# Patient Record
Sex: FEMALE | Race: WHITE | NOT HISPANIC OR LATINO | Employment: FULL TIME | URBAN - METROPOLITAN AREA
[De-identification: names, ages, dates, MRNs, and addresses within clinical notes are randomized per-mention and may not be internally consistent; named-entity substitution may affect disease eponyms.]

---

## 2017-02-11 ENCOUNTER — HISTORICAL (OUTPATIENT)
Dept: LAB | Facility: HOSPITAL | Age: 52
End: 2017-02-11

## 2017-07-24 ENCOUNTER — HISTORICAL (OUTPATIENT)
Dept: LAB | Facility: HOSPITAL | Age: 52
End: 2017-07-24

## 2017-08-10 ENCOUNTER — HISTORICAL (OUTPATIENT)
Dept: RADIOLOGY | Facility: HOSPITAL | Age: 52
End: 2017-08-10

## 2018-08-09 ENCOUNTER — HISTORICAL (OUTPATIENT)
Dept: LAB | Facility: HOSPITAL | Age: 53
End: 2018-08-09

## 2018-10-19 ENCOUNTER — HISTORICAL (OUTPATIENT)
Dept: RADIOLOGY | Facility: HOSPITAL | Age: 53
End: 2018-10-19

## 2019-07-02 ENCOUNTER — HISTORICAL (OUTPATIENT)
Dept: EMERGENCY MEDICINE | Facility: HOSPITAL | Age: 54
End: 2019-07-02

## 2019-08-05 ENCOUNTER — HISTORICAL (OUTPATIENT)
Dept: LAB | Facility: HOSPITAL | Age: 54
End: 2019-08-05

## 2019-10-26 ENCOUNTER — HISTORICAL (OUTPATIENT)
Dept: RADIOLOGY | Facility: HOSPITAL | Age: 54
End: 2019-10-26

## 2019-11-04 ENCOUNTER — HISTORICAL (OUTPATIENT)
Dept: LAB | Facility: HOSPITAL | Age: 54
End: 2019-11-04

## 2019-11-04 LAB
BUN SERPL-MCNC: 27.1 MG/DL (ref 7–18)
CALCIUM SERPL-MCNC: 8.9 MG/DL (ref 8.5–10.1)
CHLORIDE SERPL-SCNC: 105 MMOL/L (ref 98–107)
CO2 SERPL-SCNC: 31.1 MMOL/L (ref 21–32)
CREAT SERPL-MCNC: 0.92 MG/DL (ref 0.6–1.3)
CREAT/UREA NIT SERPL: 29
GLUCOSE SERPL-MCNC: 134 MG/DL (ref 74–106)
POTASSIUM SERPL-SCNC: 3.4 MMOL/L (ref 3.5–5.1)
SODIUM SERPL-SCNC: 144 MMOL/L (ref 136–145)

## 2019-11-09 ENCOUNTER — HISTORICAL (OUTPATIENT)
Dept: LAB | Facility: HOSPITAL | Age: 54
End: 2019-11-09

## 2019-11-09 LAB — POTASSIUM SERPL-SCNC: 3.6 MMOL/L (ref 3.5–5.1)

## 2020-02-26 ENCOUNTER — HISTORICAL (OUTPATIENT)
Dept: LAB | Facility: HOSPITAL | Age: 55
End: 2020-02-26

## 2020-12-21 ENCOUNTER — HISTORICAL (OUTPATIENT)
Dept: RADIOLOGY | Facility: HOSPITAL | Age: 55
End: 2020-12-21

## 2021-11-11 ENCOUNTER — HISTORICAL (OUTPATIENT)
Dept: LAB | Facility: HOSPITAL | Age: 56
End: 2021-11-11

## 2022-02-21 ENCOUNTER — HISTORICAL (OUTPATIENT)
Dept: RADIOLOGY | Facility: HOSPITAL | Age: 57
End: 2022-02-21

## 2022-02-21 ENCOUNTER — HISTORICAL (OUTPATIENT)
Dept: ADMINISTRATIVE | Facility: HOSPITAL | Age: 57
End: 2022-02-21

## 2022-04-07 ENCOUNTER — HISTORICAL (OUTPATIENT)
Dept: ADMINISTRATIVE | Facility: HOSPITAL | Age: 57
End: 2022-04-07

## 2022-04-23 VITALS
WEIGHT: 185.88 LBS | OXYGEN SATURATION: 96 % | BODY MASS INDEX: 27.53 KG/M2 | SYSTOLIC BLOOD PRESSURE: 155 MMHG | DIASTOLIC BLOOD PRESSURE: 91 MMHG | HEIGHT: 69 IN

## 2022-04-29 NOTE — ED PROVIDER NOTES
Patient:   Ashley Murguia            MRN: [id number]            FIN: [id number]               Age:   54 years     Sex:  Female     :  1965   Associated Diagnoses:   Hypertension; Tension headache   Author:   Daniel Munoz MD      Basic Information   Time seen: Date & time 2019 10:48:00.   History source: Patient.   Arrival mode: Private vehicle.   History limitation: None.   Additional information: Chief Complaint from Nursing Triage Note : Chief Complaint   2019 10:49 CDT       Chief Complaint           states stopped taking b/p meds and feels dizzy and has high b/p for a week  .      History of Present Illness   The patient presents with c/o ha since stopping her bp Meds  said the b blocker was making her feel bad so she stopped it last week  no her bp is running high  still taking her Diovan.  The onset was 1  weeks ago.  The course/duration of symptoms is constant.  The character of symptoms is: The degree at onset was minimal.  The degree at maximum was minimal.  The degree at present is minimal.  Risk factors consist of hypertension.  Prior episodes: occasional.  Therapy today: prescription medications.  Associated symptoms: headache and denies chest pain.        Review of Systems   Constitutional symptoms:  No fever, no weakness.    Respiratory symptoms:  No shortness of breath,    Cardiovascular symptoms:  No chest pain,    Neurologic symptoms:  Headache.   Psychiatric symptoms:  Anxiety.             Additional review of systems information: All other systems reviewed and otherwise negative.      Health Status   Allergies:    Allergic Reactions (Selected)  No Known Allergies,    Allergies (1) Active Reaction  No Known Allergies None Documented  .   Medications:  (Selected)   Prescriptions  Prescribed  Flomax 0.4 mg oral capsule: 0.4 mg = 1 cap(s), Oral, Daily, # 15 cap(s), 0 Refill(s)  Zofran 4 mg oral tablet: 4 mg = 1 tab(s), Oral, q4hr, # 12 tab(s), 0 Refill(s)  Documented  Medications  Documented  Cipro 500 mg oral tablet: 500 mg = 1 tab(s), Oral, q12hr, # 28 tab(s), 0 Refill(s)  Coreg 12.5 mg oral tablet: 12.5 mg = 1 tab(s), Oral, BID, # 60 tab(s), 0 Refill(s)  Coreg 25 mg oral tablet: 25 mg = 1 tab(s), Oral, BID, # 180 tab(s), 0 Refill(s)  Crestor 5 mg oral tablet: 5 mg = 1 tab(s), Oral, Daily, # 30 tab(s), 0 Refill(s)  Diovan 160 mg oral tablet: 160 mg = 1 tab(s), Oral, Daily  Lasix 20 mg oral tablet: 20 mg = 1 tab(s), Oral, Daily, # 30 tab(s), 0 Refill(s)  Levothroid 75 mcg (0.075 mg) oral tablet: 75 mcg = 1 tab(s), Oral, Daily, # 30 tab(s), 0 Refill(s)  Luvox 100 mg Tablet: 100 mg = 1 tab(s), Oral, BID, # 180 tab(s), 0 Refill(s)  irbesartan 150 mg oral tablet: 150 mg = 1 tab(s), Oral, Daily, # 90 tab(s), 0 Refill(s)  labetalol 300 mg oral tablet: 300 mg = 1 tab(s), Oral, BID, # 60 tab(s), 0 Refill(s)  meloxicam 15 mg oral tablet: 15 mg = 1 tab(s), Oral, Daily, # 30 tab(s), 0 Refill(s)  methocarbamol 750 mg oral tablet: 1,500 mg = 2 tab(s), Oral, TID, # 60 tab(s), 0 Refill(s)  potassium chloride 20 mEq oral TABLET extended release: 20 mEq = 1 tab(s), Oral, Daily, # 30 tab(s), 0 Refill(s).      Past Medical/ Family/ Social History   Medical history:    No active or resolved past medical history items have been selected or recorded..   Surgical history:    Hysterectomy Total Abdominal on 7/1/2016 at 51 Years.  Comments:  7/1/2016 8:59 [facility] - Sheldon WEST, Milena PARNELL  auto-populated from documented surgical case  left breast lumpectomy.  Partial hysterectomy ([id number])..   Family history:    No family history items have been selected or recorded..   Social history: Not significant.      Physical Examination               Vital Signs   Vital Signs   7/2/2019 10:49 CDT       Temperature Oral          36.9 DegC                             Temperature Oral (calculated)             98.42 DegF                             Peripheral Pulse Rate     73 bpm                              Respiratory Rate          20 br/min                             SpO2                      99 %                             Oxygen Therapy            Room air                             Systolic Blood Pressure   162 mmHg  HI                             Diastolic Blood Pressure  87 mmHg  .   Measurements   7/2/2019 10:49 CDT       Weight Dosing             80 kg                             Weight Measured and Calculated in Lbs     176.37 lb                             Weight Estimated          80 kg                             Height/Length Dosing      172 cm                             Height/Length Estimated   172 cm                             Body Mass Index Estimated 27.04 kg/m2  .   Basic Oxygen Information   7/2/2019 10:49 CDT       SpO2                      99 %                             Oxygen Therapy            Room air  .   General:  Alert, no acute distress.    Skin:  Warm, pink, intact, no rash.    Eye:  Pupils are equal, round and reactive to light, extraocular movements are intact, normal conjunctiva.    Ears, nose, mouth and throat:  Oral mucosa moist, no pharyngeal erythema or exudate.    Neck:  Supple, trachea midline, no tenderness, no JVD.    Cardiovascular:  Regular rate and rhythm, No murmur, Normal peripheral perfusion, No edema.    Respiratory:  Lungs are clear to auscultation, respirations are non-labored, breath sounds are equal, Symmetrical chest wall expansion.    Musculoskeletal:  Normal ROM, normal strength, no tenderness, no swelling, no deformity.    Chest wall   Gastrointestinal:  Soft, Nontender, Non distended, Normal bowel sounds.    Neurological:  Alert and oriented to person, place, time, and situation, No focal neurological deficit observed, CN II-XII intact, normal sensory observed, normal motor observed, normal speech observed, normal coordination observed, normal and symmetrical reflexes.    Lymphatics   Psychiatric:  Cooperative, appropriate mood & affect.       Medical  Decision Making   Electrocardiogram:  Time 7/2/2019 11:13:00, rate 71, normal sinus rhythm, No ST-T changes, no ectopy, normal AZ & QRS intervals.    Results review:  Lab results : Lab View   7/2/2019 11:11 CDT       WBC                       5.2 x10(3)/mcL                             RBC                       4.53 x10(6)/mcL                             Hgb                       13.7 gm/dL                             Hct                       38.7 %                             Platelet                  251 x10(3)/mcL                             MCV                       85.4 fL                             MCH                       30.2 pg                             MCHC                      35.4 gm/dL                             RDW                       11.7 %                             MPV                       9.6 fL                             Abs Neut                  3.42 x10(3)/mcL                             Neutro Auto               65 %  NA                             Lymph Auto                24 %  NA                             Mono Auto                 8 %  NA                             Eos Auto                  2 %  NA                             Abs Eos                   0.1 x10(3)/mcL                             Basophil Auto             1 %  NA                             Abs Neutro                3.42 x10(3)/mcL                             Abs Lymph                 1.2 x10(3)/mcL                             Abs Mono                  0.4 x10(3)/mcL                             Abs Baso                  0.1 x10(3)/mcL  ,    Labs (Last four charted values)  WBC                  5.2 (JUL 02)   Hgb                  13.7 (JUL 02)   Hct                  38.7 (JUL 02)   Plt                  251 (JUL 02)   Na                   141 (JUL 02)   K                    3.8 (JUL 02)   CO2                  29.0 (JUL 02)   Cl                   106 (JUL 02)   Cr                   0.78 (JUL 02)   BUN                   H 19.0 (JUL 02)   Glucose Random       103 (JUL 02) , All Results   7/2/2019 10:49 CDT       Triage Note                 (Modified)   7/2/2019 11:11 CDT       WBC                       5.2 x10(3)/mcL                             RBC                       4.53 x10(6)/mcL                             Hgb                       13.7 gm/dL                             Hct                       38.7 %                             Platelet                  251 x10(3)/mcL                             MCV                       85.4 fL                             MCH                       30.2 pg                             MCHC                      35.4 gm/dL                             RDW                       11.7 %                             MPV                       9.6 fL                             Abs Neut                  3.42 x10(3)/mcL                             Neutro Auto               65 %  NA                             Lymph Auto                24 %  NA                             Mono Auto                 8 %  NA                             Eos Auto                  2 %  NA                             Abs Eos                   0.1 x10(3)/mcL                             Basophil Auto             1 %  NA                             Abs Neutro                3.42 x10(3)/mcL                             Abs Lymph                 1.2 x10(3)/mcL                             Abs Mono                  0.4 x10(3)/mcL                             Abs Baso                  0.1 x10(3)/mcL                             Sodium Lvl                141 mmol/L                             Potassium Lvl             3.8 mmol/L                             Chloride                  106 mmol/L                             CO2                       29.0 mmol/L                             Calcium Lvl               9.2 mg/dL                             Magnesium Lvl             2.2 mg/dL                             Glucose Lvl                103 mg/dL                             BUN                       19.0 mg/dL  HI                             Creatinine                0.78 mg/dL                             BUN/Creat Ratio           24  NA                             eGFR-AA                   >60 mL/min/1.73 m2  NA                             eGFR-MARY                  >60 mL/min/1.73 m2  NA                             TSH                       0.549 mIU/mL    7/2/2019 10:55 CDT       Pain Present              No actual or suspected pain                             Respirations              Unlabored                             Respiratory Pattern       Regular    7/2/2019 10:49 CDT       Weight Dosing             80 kg                             Weight Measured and Calculated in Lbs     176.37 lb                             Weight Estimated          80 kg                             Height/Length Dosing      172 cm                             Height/Length Estimated   172 cm                             Body Mass Index Estimated 27.04 kg/m2                             Temperature Oral          36.9 DegC                             Temperature Oral (calculated)             98.42 DegF                             Peripheral Pulse Rate     73 bpm                             Respiratory Rate          20 br/min                             SpO2                      99 %                             Oxygen Therapy            Room air                             Systolic Blood Pressure   162 mmHg  HI                             Diastolic Blood Pressure  87 mmHg                             Level of Consciousness    Alert  .       Reexamination/ Reevaluation   Time: 7/2/2019 12:06:00 .   Vital signs   per nurse's notes   Course: improving.   Assessment: exam unchanged.      Impression and Plan   Diagnosis   Hypertension (WMU68-CG I10)   Tension headache (XKP40-QO G44.209)   Plan   Condition: Improved, Stable.    Disposition: Medically cleared, Discharged: Time   7/2/2019 12:07:00, to home.    Prescriptions: Launch prescriptions   Pharmacy:  Norvasc 5 mg oral tablet (Prescribe): 5 mg = 1 tab(s), Oral, Daily, X 30 day(s), # 30 tab(s), 0 Refill(s), Pharmacy: [id number].    Patient was given the following educational materials: Hypertension, Hypertension.    Follow up with: Morgan Lofton MD; Follow-up with PCP in 3 to 5 days Follow-up with PCP in 3 to 5 days.    Counseled: Patient, Regarding diagnosis, Regarding diagnostic results, Regarding treatment plan, Regarding prescription, Patient indicated understanding of instructions.

## 2023-03-24 ENCOUNTER — HOSPITAL ENCOUNTER (OUTPATIENT)
Dept: RADIOLOGY | Facility: HOSPITAL | Age: 58
Discharge: HOME | End: 2023-03-24
Attending: OBSTETRICS & GYNECOLOGY
Payer: COMMERCIAL

## 2023-03-24 DIAGNOSIS — Z12.31 ENCOUNTER FOR SCREENING MAMMOGRAM FOR MALIGNANT NEOPLASM OF BREAST: ICD-10-CM

## 2023-03-24 PROCEDURE — 77063 MAMMO DIGITAL SCREENING BILAT WITH TOMO: ICD-10-PCS | Mod: 26,,, | Performed by: STUDENT IN AN ORGANIZED HEALTH CARE EDUCATION/TRAINING PROGRAM

## 2023-03-24 PROCEDURE — 77067 SCR MAMMO BI INCL CAD: CPT | Mod: 26,,, | Performed by: STUDENT IN AN ORGANIZED HEALTH CARE EDUCATION/TRAINING PROGRAM

## 2023-03-24 PROCEDURE — 77067 SCR MAMMO BI INCL CAD: CPT | Mod: TC

## 2023-03-24 PROCEDURE — 77067 MAMMO DIGITAL SCREENING BILAT WITH TOMO: ICD-10-PCS | Mod: 26,,, | Performed by: STUDENT IN AN ORGANIZED HEALTH CARE EDUCATION/TRAINING PROGRAM

## 2023-03-24 PROCEDURE — 77063 BREAST TOMOSYNTHESIS BI: CPT | Mod: 26,,, | Performed by: STUDENT IN AN ORGANIZED HEALTH CARE EDUCATION/TRAINING PROGRAM

## 2023-07-05 ENCOUNTER — LAB VISIT (OUTPATIENT)
Dept: LAB | Facility: HOSPITAL | Age: 58
End: 2023-07-05
Attending: NURSE PRACTITIONER
Payer: COMMERCIAL

## 2023-07-05 DIAGNOSIS — D51.9 VITAMIN B12 DEFICIENCY ANEMIA: ICD-10-CM

## 2023-07-05 DIAGNOSIS — I51.9 MYXEDEMA HEART DISEASE: ICD-10-CM

## 2023-07-05 DIAGNOSIS — E03.9 MYXEDEMA HEART DISEASE: ICD-10-CM

## 2023-07-05 DIAGNOSIS — E55.9 AVITAMINOSIS D: ICD-10-CM

## 2023-07-05 DIAGNOSIS — Z00.00 ROUTINE GENERAL MEDICAL EXAMINATION AT A HEALTH CARE FACILITY: Primary | ICD-10-CM

## 2023-07-05 LAB
ALBUMIN SERPL-MCNC: 3.9 G/DL (ref 3.5–5)
ALBUMIN/GLOB SERPL: 1.3 RATIO (ref 1.1–2)
ALP SERPL-CCNC: 74 UNIT/L (ref 40–150)
ALT SERPL-CCNC: 23 UNIT/L (ref 0–55)
AST SERPL-CCNC: 18 UNIT/L (ref 5–34)
BASOPHILS # BLD AUTO: 0.07 X10(3)/MCL
BASOPHILS NFR BLD AUTO: 1.1 %
BILIRUBIN DIRECT+TOT PNL SERPL-MCNC: 0.4 MG/DL
BUN SERPL-MCNC: 18.3 MG/DL (ref 9.8–20.1)
CALCIUM SERPL-MCNC: 9.3 MG/DL (ref 8.4–10.2)
CHLORIDE SERPL-SCNC: 102 MMOL/L (ref 98–107)
CHOLEST SERPL-MCNC: 169 MG/DL
CHOLEST/HDLC SERPL: 4 {RATIO} (ref 0–5)
CO2 SERPL-SCNC: 32 MMOL/L (ref 22–29)
CREAT SERPL-MCNC: 0.73 MG/DL (ref 0.55–1.02)
DEPRECATED CALCIDIOL+CALCIFEROL SERPL-MC: 48.4 NG/ML (ref 30–80)
EOSINOPHIL # BLD AUTO: 0.12 X10(3)/MCL (ref 0–0.9)
EOSINOPHIL NFR BLD AUTO: 1.8 %
ERYTHROCYTE [DISTWIDTH] IN BLOOD BY AUTOMATED COUNT: 12 % (ref 11.5–17)
FOLATE SERPL-MCNC: 16.2 NG/ML (ref 7–31.4)
FT4I SERPL CALC-MCNC: 3.56 (ref 2.6–3.6)
GFR SERPLBLD CREATININE-BSD FMLA CKD-EPI: >60 MLS/MIN/1.73/M2
GLOBULIN SER-MCNC: 3 GM/DL (ref 2.4–3.5)
GLUCOSE SERPL-MCNC: 95 MG/DL (ref 74–100)
HCT VFR BLD AUTO: 43.7 % (ref 37–47)
HDLC SERPL-MCNC: 48 MG/DL (ref 35–60)
HGB BLD-MCNC: 14 G/DL (ref 12–16)
IMM GRANULOCYTES # BLD AUTO: 0 X10(3)/MCL (ref 0–0.04)
IMM GRANULOCYTES NFR BLD AUTO: 0 %
LDLC SERPL CALC-MCNC: 90 MG/DL (ref 50–140)
LYMPHOCYTES # BLD AUTO: 1.98 X10(3)/MCL (ref 0.6–4.6)
LYMPHOCYTES NFR BLD AUTO: 30.3 %
MCH RBC QN AUTO: 28.6 PG (ref 27–31)
MCHC RBC AUTO-ENTMCNC: 32 G/DL (ref 33–36)
MCV RBC AUTO: 89.4 FL (ref 80–94)
MONOCYTES # BLD AUTO: 0.53 X10(3)/MCL (ref 0.1–1.3)
MONOCYTES NFR BLD AUTO: 8.1 %
NEUTROPHILS # BLD AUTO: 3.83 X10(3)/MCL (ref 2.1–9.2)
NEUTROPHILS NFR BLD AUTO: 58.7 %
PLATELET # BLD AUTO: 287 X10(3)/MCL (ref 130–400)
PMV BLD AUTO: 9.3 FL (ref 7.4–10.4)
POTASSIUM SERPL-SCNC: 3.9 MMOL/L (ref 3.5–5.1)
PROT SERPL-MCNC: 6.9 GM/DL (ref 6.4–8.3)
RBC # BLD AUTO: 4.89 X10(6)/MCL (ref 4.2–5.4)
SODIUM SERPL-SCNC: 140 MMOL/L (ref 136–145)
T3RU NFR SERPL: 36.5 % (ref 31–39)
T4 SERPL-MCNC: 9.74 UG/DL (ref 4.87–11.72)
TRIGL SERPL-MCNC: 157 MG/DL (ref 37–140)
TSH SERPL-ACNC: 1.6 UIU/ML (ref 0.35–4.94)
VIT B12 SERPL-MCNC: 740 PG/ML (ref 213–816)
VLDLC SERPL CALC-MCNC: 31 MG/DL
WBC # SPEC AUTO: 6.53 X10(3)/MCL (ref 4.5–11.5)

## 2023-07-05 PROCEDURE — 82746 ASSAY OF FOLIC ACID SERUM: CPT

## 2023-07-05 PROCEDURE — 84436 ASSAY OF TOTAL THYROXINE: CPT

## 2023-07-05 PROCEDURE — 85025 COMPLETE CBC W/AUTO DIFF WBC: CPT

## 2023-07-05 PROCEDURE — 80053 COMPREHEN METABOLIC PANEL: CPT

## 2023-07-05 PROCEDURE — 82306 VITAMIN D 25 HYDROXY: CPT

## 2023-07-05 PROCEDURE — 84443 ASSAY THYROID STIM HORMONE: CPT

## 2023-07-05 PROCEDURE — 80061 LIPID PANEL: CPT

## 2023-07-05 PROCEDURE — 82607 VITAMIN B-12: CPT

## 2023-07-05 PROCEDURE — 36415 COLL VENOUS BLD VENIPUNCTURE: CPT

## 2024-01-03 ENCOUNTER — LAB VISIT (OUTPATIENT)
Dept: LAB | Facility: HOSPITAL | Age: 59
End: 2024-01-03
Attending: NURSE PRACTITIONER
Payer: COMMERCIAL

## 2024-01-03 DIAGNOSIS — E03.9 MYXEDEMA HEART DISEASE: Primary | ICD-10-CM

## 2024-01-03 DIAGNOSIS — E55.9 AVITAMINOSIS D: ICD-10-CM

## 2024-01-03 DIAGNOSIS — I51.9 MYXEDEMA HEART DISEASE: Primary | ICD-10-CM

## 2024-01-03 LAB
DEPRECATED CALCIDIOL+CALCIFEROL SERPL-MC: 37.6 NG/ML (ref 30–80)
FT4I SERPL CALC-MCNC: 3.05 (ref 2.6–3.6)
T3RU NFR SERPL: 31.68 % (ref 31–39)
T4 SERPL-MCNC: 9.63 UG/DL (ref 4.87–11.72)
TSH SERPL-ACNC: 0.65 UIU/ML (ref 0.35–4.94)

## 2024-01-03 PROCEDURE — 82306 VITAMIN D 25 HYDROXY: CPT

## 2024-01-03 PROCEDURE — 84443 ASSAY THYROID STIM HORMONE: CPT

## 2024-01-03 PROCEDURE — 36415 COLL VENOUS BLD VENIPUNCTURE: CPT

## 2024-01-03 PROCEDURE — 84436 ASSAY OF TOTAL THYROXINE: CPT

## 2024-04-16 ENCOUNTER — HOSPITAL ENCOUNTER (OUTPATIENT)
Dept: RADIOLOGY | Facility: HOSPITAL | Age: 59
Discharge: HOME | End: 2024-04-16
Attending: OBSTETRICS & GYNECOLOGY
Payer: COMMERCIAL

## 2024-04-16 DIAGNOSIS — Z12.31 ENCOUNTER FOR SCREENING MAMMOGRAM FOR MALIGNANT NEOPLASM OF BREAST: ICD-10-CM

## 2024-04-16 PROCEDURE — 77063 BREAST TOMOSYNTHESIS BI: CPT | Mod: 26,,, | Performed by: RADIOLOGY

## 2024-04-16 PROCEDURE — 77067 SCR MAMMO BI INCL CAD: CPT | Mod: 26,,, | Performed by: RADIOLOGY

## 2024-04-16 PROCEDURE — 77063 BREAST TOMOSYNTHESIS BI: CPT | Mod: TC

## 2024-08-24 ENCOUNTER — LAB VISIT (OUTPATIENT)
Dept: LAB | Facility: HOSPITAL | Age: 59
End: 2024-08-24
Attending: NURSE PRACTITIONER
Payer: COMMERCIAL

## 2024-08-24 DIAGNOSIS — I10 ESSENTIAL HYPERTENSION, MALIGNANT: Primary | ICD-10-CM

## 2024-08-24 LAB
ALBUMIN SERPL-MCNC: 3.8 G/DL (ref 3.4–4.8)
ALBUMIN/GLOB SERPL: 1.2 RATIO (ref 1.1–2)
ALP SERPL-CCNC: 88 UNIT/L (ref 40–150)
ALT SERPL-CCNC: 22 UNIT/L (ref 0–55)
ANION GAP SERPL CALC-SCNC: 8 MEQ/L
AST SERPL-CCNC: 19 UNIT/L (ref 5–34)
BASOPHILS # BLD AUTO: 0.05 X10(3)/MCL
BASOPHILS NFR BLD AUTO: 0.9 %
BILIRUB SERPL-MCNC: 0.4 MG/DL
BUN SERPL-MCNC: 16.7 MG/DL (ref 9.8–20.1)
CALCIUM SERPL-MCNC: 9.2 MG/DL (ref 8.4–10.2)
CHLORIDE SERPL-SCNC: 108 MMOL/L (ref 98–107)
CHOLEST SERPL-MCNC: 151 MG/DL
CHOLEST/HDLC SERPL: 3 {RATIO} (ref 0–5)
CO2 SERPL-SCNC: 28 MMOL/L (ref 23–31)
CREAT SERPL-MCNC: 0.74 MG/DL (ref 0.55–1.02)
CREAT/UREA NIT SERPL: 23
EOSINOPHIL # BLD AUTO: 0.16 X10(3)/MCL (ref 0–0.9)
EOSINOPHIL NFR BLD AUTO: 3 %
ERYTHROCYTE [DISTWIDTH] IN BLOOD BY AUTOMATED COUNT: 12.2 % (ref 11.5–17)
FT4I SERPL CALC-MCNC: 2.65 (ref 2.6–3.6)
GFR SERPLBLD CREATININE-BSD FMLA CKD-EPI: >60 ML/MIN/1.73/M2
GLOBULIN SER-MCNC: 3.2 GM/DL (ref 2.4–3.5)
GLUCOSE SERPL-MCNC: 98 MG/DL (ref 82–115)
HCT VFR BLD AUTO: 43 % (ref 37–47)
HDLC SERPL-MCNC: 46 MG/DL (ref 35–60)
HGB BLD-MCNC: 14.1 G/DL (ref 12–16)
IMM GRANULOCYTES # BLD AUTO: 0.01 X10(3)/MCL (ref 0–0.04)
IMM GRANULOCYTES NFR BLD AUTO: 0.2 %
LDLC SERPL CALC-MCNC: 89 MG/DL (ref 50–140)
LYMPHOCYTES # BLD AUTO: 1.59 X10(3)/MCL (ref 0.6–4.6)
LYMPHOCYTES NFR BLD AUTO: 30 %
MCH RBC QN AUTO: 29.7 PG (ref 27–31)
MCHC RBC AUTO-ENTMCNC: 32.8 G/DL (ref 33–36)
MCV RBC AUTO: 90.7 FL (ref 80–94)
MONOCYTES # BLD AUTO: 0.56 X10(3)/MCL (ref 0.1–1.3)
MONOCYTES NFR BLD AUTO: 10.6 %
NEUTROPHILS # BLD AUTO: 2.93 X10(3)/MCL (ref 2.1–9.2)
NEUTROPHILS NFR BLD AUTO: 55.3 %
PLATELET # BLD AUTO: 256 X10(3)/MCL (ref 130–400)
PMV BLD AUTO: 9.3 FL (ref 7.4–10.4)
POTASSIUM SERPL-SCNC: 3.6 MMOL/L (ref 3.5–5.1)
PROT SERPL-MCNC: 7 GM/DL (ref 5.8–7.6)
RBC # BLD AUTO: 4.74 X10(6)/MCL (ref 4.2–5.4)
SODIUM SERPL-SCNC: 144 MMOL/L (ref 136–145)
T3RU NFR SERPL: 31.29 % (ref 31–39)
T4 SERPL-MCNC: 8.46 UG/DL (ref 4.87–11.72)
TRIGL SERPL-MCNC: 79 MG/DL (ref 37–140)
TSH SERPL-ACNC: 1.08 UIU/ML (ref 0.35–4.94)
VLDLC SERPL CALC-MCNC: 16 MG/DL
WBC # BLD AUTO: 5.3 X10(3)/MCL (ref 4.5–11.5)

## 2024-08-24 PROCEDURE — 80061 LIPID PANEL: CPT

## 2024-08-24 PROCEDURE — 36415 COLL VENOUS BLD VENIPUNCTURE: CPT

## 2024-08-24 PROCEDURE — 80053 COMPREHEN METABOLIC PANEL: CPT

## 2024-08-24 PROCEDURE — 85025 COMPLETE CBC W/AUTO DIFF WBC: CPT

## 2024-08-24 PROCEDURE — 84436 ASSAY OF TOTAL THYROXINE: CPT

## 2024-08-24 PROCEDURE — 84443 ASSAY THYROID STIM HORMONE: CPT

## 2025-03-03 ENCOUNTER — HOSPITAL ENCOUNTER (EMERGENCY)
Facility: HOSPITAL | Age: 60
Discharge: HOME | End: 2025-03-03
Attending: EMERGENCY MEDICINE
Payer: COMMERCIAL

## 2025-03-03 VITALS
SYSTOLIC BLOOD PRESSURE: 146 MMHG | HEART RATE: 94 BPM | DIASTOLIC BLOOD PRESSURE: 102 MMHG | RESPIRATION RATE: 20 BRPM | HEIGHT: 68 IN | BODY MASS INDEX: 30.31 KG/M2 | WEIGHT: 200 LBS | TEMPERATURE: 99 F | OXYGEN SATURATION: 97 %

## 2025-03-03 DIAGNOSIS — R09.89 THROAT TIGHTNESS: Primary | ICD-10-CM

## 2025-03-03 LAB — STREP A PCR (OHS): NOT DETECTED

## 2025-03-03 PROCEDURE — 87651 STREP A DNA AMP PROBE: CPT | Performed by: EMERGENCY MEDICINE

## 2025-03-03 PROCEDURE — 99282 EMERGENCY DEPT VISIT SF MDM: CPT

## 2025-03-03 NOTE — ED PROVIDER NOTES
"NAME:  Ashley Murguia  CSN:     [id number]  MRN:    [id number]  ADMIT DATE: 3/3/2025        eMERGENCY dEPARTMENT eNCOUnter    CHIEF COMPLAINT    Chief Complaint   Patient presents with    Sore Throat     Complains of a tightness and pain in her throat x 3 weeks. Worse in the mornings. Denies fever, no drooling noted.       HPI      Ashley Murguia is a 59 y.o. female who presents to the ED complains of feeling of tightness in her throat ongoing intermittently for the past 3 weeks.  She reports it is worse in the morning.  She has no objective difficulty swallowing or breathing but has a sensation of difficulty swallowing and breathing.  She denies any throat pain.  No fevers.        ALLERGIES    Review of patient's allergies indicates:  No Known Allergies    PAST MEDICAL HISTORY  History reviewed. No pertinent past medical history.    SURGICAL HISTORY    Past Surgical History:   Procedure Laterality Date    HYSTERECTOMY      LUMPECTOMY, BREAST Left        SOCIAL HISTORY    Social History     Socioeconomic History    Marital status: Unknown   Tobacco Use    Smoking status: Never    Smokeless tobacco: Never       FAMILY HISTORY    Family History   Problem Relation Name Age of Onset    Diabetes Father      Hypertension Father      Kidney cancer Father      Hypertension Mother      Ovarian cancer Mother         REVIEW OF SYSTEMS   ROS  All Systems otherwise negative except as noted in the History of Present Illness.        PHYSICAL EXAM    Reviewed Triage Note  VITAL SIGNS:   ED Triage Vitals [03/03/25 0857]   Encounter Vitals Group      BP (!) 146/102      Systolic BP Percentile       Diastolic BP Percentile       Pulse 94      Resp 20      Temp 98.5 °F (36.9 °C)      Temp Source Oral      SpO2 97 %      Weight 200 lb      Height 5' 8"      Head Circumference       Peak Flow       Pain Score       Pain Loc       Pain Education       Exclude from Growth Chart        Patient Vitals for the past 24 hrs:   BP Temp Temp " "src Pulse Resp SpO2 Height Weight   03/03/25 0857 (!) 146/102 98.5 °F (36.9 °C) Oral 94 20 97 % 5' 8" (1.727 m) 90.7 kg (200 lb)           Physical Exam    Constitutional:  Well-developed, well-nourished. No acute distress  HENT:  Normocephalic, atraumatic.  No posterior pharyngeal edema or erythema  Eyes:  EOMI. Conjunctiva normal without discharge.   Neck: Normal range of motion.No stridor. No meningismus.  No thyromegaly noted  Respiratory:  No respiratory distress, retractions, or conversational dyspnea. Cardiovascular:  Normal heart rate. No pitting lower extremity edema.   Musculoskeletal:  No gross deformity or limited range of motion of all major joints.   Integument:  Warm and dry. No rash.  Neurologic:  Normal motor function. No focal deficits noted. Alert and Interactive.  Psychiatric:  Affect normal. Mood normal.         LABS  Pertinent labs reviewed. (See chart for details)   Labs Reviewed   STREP GROUP A BY PCR - Normal       Result Value    STREP A PCR ([facility]) Not Detected      Narrative:     The Xpert Xpress Strep A test is a rapid, qualitative in vitro diagnostic test for the detection of Streptococcus pyogenes (Group A ß-hemolytic Streptococcus, Strep A) in throat swab specimens from patients with signs and symptoms of pharyngitis.           RADIOLOGY    Imaging Results    None         PROCEDURES    Procedures      EKG     Interpreted by ERP:         ED COURSE & MEDICAL DECISION MAKING    Pertinent & Imaging studies reviewed. (See chart for details and specific orders.)        Medications - No data to display       Unclear cause of patient's symptoms.  She was advised to follow-up with the ENT and her PCP       DISPOSITION  Patient discharged in stable condition at No discharge date for patient encounter.      DISCHARGE INSTRUCTIONS & MEDS       Medication List        ASK your doctor about these medications      ARMOUR THYROID ORAL     COREG ORAL     CRESTOR ORAL     ergocalciferol 50,000 unit " Cap  Commonly known as: ERGOCALCIFEROL     FLUVOXAMINE ORAL     LASIX ORAL     pramoxine-hydrocortisone 1-1 % rectal cream  Commonly known as: PROCTOCREAM-HC  Place rectally 2 (two) times daily.     PRAVASTATIN ORAL     * EUTHYROX 75 mcg tablet  Generic drug: levothyroxine     * SYNTHROID ORAL     * VALSARTAN ORAL     * DIOVAN ORAL     valsartan-hydrochlorothiazide 160-25 mg per tablet  Commonly known as: DIOVAN-HCT           * This list has 4 medication(s) that are the same as other medications prescribed for you. Read the directions carefully, and ask your doctor or other care provider to review them with you.                    New Prescriptions    No medications on file           FINAL IMPRESSION    1. Throat tightness              Blood Pressure Follow-Up Advised  Patient advised to follow up with PCP within 3-5 days for blood pressure re-check if blood pressure is equal to or greater than 120/80.         Critical care time spent with this patient (not including separately billable items) was  0 minutes.     DISCLAIMER: This note was prepared with Dragon NaturallySpeaking voice recognition transcription software. Garbled syntax, mangled pronouns, and other bizarre constructions may be attributed to that software system.      August Seaman MD  03/03/2025  10:20 AM           August Seaman MD  03/03/25 0198

## 2025-03-22 ENCOUNTER — LAB VISIT (OUTPATIENT)
Dept: LAB | Facility: HOSPITAL | Age: 60
End: 2025-03-22
Attending: NURSE PRACTITIONER
Payer: COMMERCIAL

## 2025-03-22 DIAGNOSIS — E03.9 MYXEDEMA HEART DISEASE: Primary | ICD-10-CM

## 2025-03-22 DIAGNOSIS — I51.9 MYXEDEMA HEART DISEASE: Primary | ICD-10-CM

## 2025-03-22 LAB — TSH SERPL-ACNC: 0.72 UIU/ML (ref 0.35–4.94)

## 2025-03-22 PROCEDURE — 84443 ASSAY THYROID STIM HORMONE: CPT

## 2025-03-22 PROCEDURE — 84436 ASSAY OF TOTAL THYROXINE: CPT

## 2025-03-22 PROCEDURE — 36415 COLL VENOUS BLD VENIPUNCTURE: CPT

## 2025-03-27 LAB — MAYO GENERIC ORDERABLE RESULT: NORMAL

## 2025-04-01 DIAGNOSIS — Z12.31 BREAST CANCER SCREENING BY MAMMOGRAM: Primary | ICD-10-CM

## 2025-04-22 ENCOUNTER — HOSPITAL ENCOUNTER (OUTPATIENT)
Dept: RADIOLOGY | Facility: HOSPITAL | Age: 60
Discharge: HOME | End: 2025-04-22
Attending: NURSE PRACTITIONER
Payer: COMMERCIAL

## 2025-04-22 DIAGNOSIS — Z12.31 BREAST CANCER SCREENING BY MAMMOGRAM: ICD-10-CM

## 2025-04-22 PROCEDURE — 77063 BREAST TOMOSYNTHESIS BI: CPT | Mod: TC

## 2025-08-02 ENCOUNTER — LAB VISIT (OUTPATIENT)
Dept: LAB | Facility: HOSPITAL | Age: 60
End: 2025-08-02
Attending: NURSE PRACTITIONER
Payer: COMMERCIAL

## 2025-08-02 DIAGNOSIS — I51.9 MYXEDEMA HEART DISEASE: ICD-10-CM

## 2025-08-02 DIAGNOSIS — E55.9 AVITAMINOSIS D: ICD-10-CM

## 2025-08-02 DIAGNOSIS — E66.01 MORBID OBESITY: ICD-10-CM

## 2025-08-02 DIAGNOSIS — E03.9 MYXEDEMA HEART DISEASE: ICD-10-CM

## 2025-08-02 DIAGNOSIS — Z00.00 ROUTINE GENERAL MEDICAL EXAMINATION AT A HEALTH CARE FACILITY: Primary | ICD-10-CM

## 2025-08-02 LAB
25(OH)D3+25(OH)D2 SERPL-MCNC: 68 NG/ML (ref 30–80)
ALBUMIN SERPL-MCNC: 3.8 G/DL (ref 3.4–4.8)
ALBUMIN/GLOB SERPL: 1.2 RATIO (ref 1.1–2)
ALP SERPL-CCNC: 78 UNIT/L (ref 40–150)
ALT SERPL-CCNC: 26 UNIT/L (ref 0–55)
ANION GAP SERPL CALC-SCNC: 10 MEQ/L
AST SERPL-CCNC: 19 UNIT/L (ref 11–45)
BILIRUB SERPL-MCNC: 0.5 MG/DL
BUN SERPL-MCNC: 25.7 MG/DL (ref 9.8–20.1)
CALCIUM SERPL-MCNC: 9.2 MG/DL (ref 8.4–10.2)
CHLORIDE SERPL-SCNC: 106 MMOL/L (ref 98–107)
CHOLEST SERPL-MCNC: 181 MG/DL
CHOLEST/HDLC SERPL: 5 {RATIO} (ref 0–5)
CO2 SERPL-SCNC: 28 MMOL/L (ref 23–31)
CREAT SERPL-MCNC: 0.73 MG/DL (ref 0.55–1.02)
CREAT/UREA NIT SERPL: 35
ERYTHROCYTE [DISTWIDTH] IN BLOOD BY AUTOMATED COUNT: 11.8 % (ref 11.5–17)
EST. AVERAGE GLUCOSE BLD GHB EST-MCNC: 102.5 MG/DL
GFR SERPLBLD CREATININE-BSD FMLA CKD-EPI: >60 ML/MIN/1.73/M2
GLOBULIN SER-MCNC: 3.3 GM/DL (ref 2.4–3.5)
GLUCOSE SERPL-MCNC: 100 MG/DL (ref 82–115)
HBA1C MFR BLD: 5.2 %
HCT VFR BLD AUTO: 43.8 % (ref 37–47)
HDLC SERPL-MCNC: 38 MG/DL (ref 35–60)
HGB BLD-MCNC: 14.7 G/DL (ref 12–16)
LDLC SERPL CALC-MCNC: 111 MG/DL (ref 50–140)
MCH RBC QN AUTO: 29.4 PG (ref 27–31)
MCHC RBC AUTO-ENTMCNC: 33.6 G/DL (ref 33–36)
MCV RBC AUTO: 87.6 FL (ref 80–94)
PLATELET # BLD AUTO: 248 X10(3)/MCL (ref 130–400)
PMV BLD AUTO: 9.4 FL (ref 7.4–10.4)
POTASSIUM SERPL-SCNC: 4 MMOL/L (ref 3.5–5.1)
PROT SERPL-MCNC: 7.1 GM/DL (ref 5.8–7.6)
RBC # BLD AUTO: 5 X10(6)/MCL (ref 4.2–5.4)
SODIUM SERPL-SCNC: 144 MMOL/L (ref 136–145)
TRIGL SERPL-MCNC: 160 MG/DL (ref 37–140)
TSH SERPL-ACNC: 1.1 UIU/ML (ref 0.35–4.94)
VLDLC SERPL CALC-MCNC: 32 MG/DL
WBC # BLD AUTO: 5.26 X10(3)/MCL (ref 4.5–11.5)

## 2025-08-02 PROCEDURE — 80061 LIPID PANEL: CPT

## 2025-08-02 PROCEDURE — 82306 VITAMIN D 25 HYDROXY: CPT

## 2025-08-02 PROCEDURE — 84443 ASSAY THYROID STIM HORMONE: CPT

## 2025-08-02 PROCEDURE — 85027 COMPLETE CBC AUTOMATED: CPT

## 2025-08-02 PROCEDURE — 36415 COLL VENOUS BLD VENIPUNCTURE: CPT

## 2025-08-02 PROCEDURE — 83036 HEMOGLOBIN GLYCOSYLATED A1C: CPT

## 2025-08-02 PROCEDURE — 80053 COMPREHEN METABOLIC PANEL: CPT

## 2025-08-02 PROCEDURE — 84432 ASSAY OF THYROGLOBULIN: CPT

## 2025-08-05 LAB
THYROGLOB AB SERPL IA-ACNC: 66 IU/ML
THYROID PEROXIDASE QUANT (OLG): 4 IU/ML